# Patient Record
Sex: MALE | Race: WHITE | Employment: OTHER | URBAN - METROPOLITAN AREA
[De-identification: names, ages, dates, MRNs, and addresses within clinical notes are randomized per-mention and may not be internally consistent; named-entity substitution may affect disease eponyms.]

---

## 2019-10-31 PROBLEM — M65.342 ACQUIRED TRIGGER FINGER OF LEFT RING FINGER: Status: ACTIVE | Noted: 2019-10-31

## 2019-10-31 PROBLEM — M65.342 ACQUIRED TRIGGER FINGER OF LEFT RING FINGER: Status: RESOLVED | Noted: 2019-10-31 | Resolved: 2019-10-31

## 2021-07-06 PROBLEM — E78.5 HYPERLIPIDEMIA: Status: ACTIVE | Noted: 2021-02-17

## 2021-07-06 PROBLEM — E11.42 DIABETIC POLYNEUROPATHY ASSOCIATED WITH TYPE 2 DIABETES MELLITUS (HCC): Status: ACTIVE | Noted: 2021-02-17

## 2021-07-06 PROBLEM — K21.9 GERD (GASTROESOPHAGEAL REFLUX DISEASE): Status: ACTIVE | Noted: 2021-02-17

## 2021-07-06 PROBLEM — Z86.010 HISTORY OF COLONIC POLYPS: Status: ACTIVE | Noted: 2017-03-14

## 2021-07-06 PROBLEM — R73.02 GLUCOSE INTOLERANCE (IMPAIRED GLUCOSE TOLERANCE): Status: ACTIVE | Noted: 2021-02-17

## 2021-07-06 PROBLEM — G25.0 HEREDITARY ESSENTIAL TREMOR: Status: ACTIVE | Noted: 2019-04-24

## 2021-07-06 PROBLEM — E53.8 B12 DEFICIENCY: Status: ACTIVE | Noted: 2019-04-17

## 2021-07-06 PROBLEM — G25.1 DRUG-INDUCED TREMOR: Status: ACTIVE | Noted: 2021-02-17

## 2021-07-06 PROBLEM — G47.30 SLEEP APNEA: Status: ACTIVE | Noted: 2021-02-17

## 2021-07-06 PROBLEM — I10 HYPERTENSION: Status: ACTIVE | Noted: 2021-02-17

## 2021-07-07 ENCOUNTER — OFFICE VISIT (OUTPATIENT)
Dept: FAMILY MEDICINE CLINIC | Age: 76
End: 2021-07-07
Payer: MEDICARE

## 2021-07-07 VITALS
DIASTOLIC BLOOD PRESSURE: 76 MMHG | RESPIRATION RATE: 18 BRPM | HEIGHT: 68 IN | BODY MASS INDEX: 36 KG/M2 | SYSTOLIC BLOOD PRESSURE: 130 MMHG | WEIGHT: 237.5 LBS | HEART RATE: 62 BPM | TEMPERATURE: 97 F | OXYGEN SATURATION: 98 %

## 2021-07-07 DIAGNOSIS — K21.9 GASTROESOPHAGEAL REFLUX DISEASE WITHOUT ESOPHAGITIS: ICD-10-CM

## 2021-07-07 DIAGNOSIS — N40.1 BPH WITH OBSTRUCTION/LOWER URINARY TRACT SYMPTOMS: Primary | ICD-10-CM

## 2021-07-07 DIAGNOSIS — Z86.010 HISTORY OF COLONIC POLYPS: ICD-10-CM

## 2021-07-07 DIAGNOSIS — F39 EPISODIC MOOD DISORDER (HCC): ICD-10-CM

## 2021-07-07 DIAGNOSIS — G25.0 HEREDITARY ESSENTIAL TREMOR: ICD-10-CM

## 2021-07-07 DIAGNOSIS — G47.33 OBSTRUCTIVE SLEEP APNEA SYNDROME: ICD-10-CM

## 2021-07-07 DIAGNOSIS — E11.42 DIABETIC POLYNEUROPATHY ASSOCIATED WITH TYPE 2 DIABETES MELLITUS (HCC): ICD-10-CM

## 2021-07-07 DIAGNOSIS — I10 BENIGN ESSENTIAL HYPERTENSION: ICD-10-CM

## 2021-07-07 DIAGNOSIS — E78.2 MIXED HYPERLIPIDEMIA: ICD-10-CM

## 2021-07-07 DIAGNOSIS — N13.8 BPH WITH OBSTRUCTION/LOWER URINARY TRACT SYMPTOMS: Primary | ICD-10-CM

## 2021-07-07 PROBLEM — E53.8 B12 DEFICIENCY: Status: RESOLVED | Noted: 2019-04-17 | Resolved: 2021-07-07

## 2021-07-07 PROBLEM — G25.1 DRUG-INDUCED TREMOR: Status: RESOLVED | Noted: 2021-02-17 | Resolved: 2021-07-07

## 2021-07-07 PROBLEM — R73.02 GLUCOSE INTOLERANCE (IMPAIRED GLUCOSE TOLERANCE): Status: RESOLVED | Noted: 2021-02-17 | Resolved: 2021-07-07

## 2021-07-07 PROCEDURE — 3017F COLORECTAL CA SCREEN DOC REV: CPT | Performed by: FAMILY MEDICINE

## 2021-07-07 PROCEDURE — 3046F HEMOGLOBIN A1C LEVEL >9.0%: CPT | Performed by: FAMILY MEDICINE

## 2021-07-07 PROCEDURE — 99204 OFFICE O/P NEW MOD 45 MIN: CPT | Performed by: FAMILY MEDICINE

## 2021-07-07 PROCEDURE — 2022F DILAT RTA XM EVC RTNOPTHY: CPT | Performed by: FAMILY MEDICINE

## 2021-07-07 PROCEDURE — G8536 NO DOC ELDER MAL SCRN: HCPCS | Performed by: FAMILY MEDICINE

## 2021-07-07 PROCEDURE — G8417 CALC BMI ABV UP PARAM F/U: HCPCS | Performed by: FAMILY MEDICINE

## 2021-07-07 PROCEDURE — 1101F PT FALLS ASSESS-DOCD LE1/YR: CPT | Performed by: FAMILY MEDICINE

## 2021-07-07 PROCEDURE — G8752 SYS BP LESS 140: HCPCS | Performed by: FAMILY MEDICINE

## 2021-07-07 PROCEDURE — G8427 DOCREV CUR MEDS BY ELIG CLIN: HCPCS | Performed by: FAMILY MEDICINE

## 2021-07-07 PROCEDURE — G8432 DEP SCR NOT DOC, RNG: HCPCS | Performed by: FAMILY MEDICINE

## 2021-07-07 PROCEDURE — G8754 DIAS BP LESS 90: HCPCS | Performed by: FAMILY MEDICINE

## 2021-07-07 RX ORDER — DUTASTERIDE AND TAMSULOSIN HYDROCHLORIDE CAPSULES .5; .4 MG/1; MG/1
1 CAPSULE ORAL DAILY
Qty: 90 CAPSULE | Refills: 0 | Status: SHIPPED | OUTPATIENT
Start: 2021-07-07 | End: 2021-10-12

## 2021-07-07 NOTE — PROGRESS NOTES
1. Have you been to the ER, urgent care clinic since your last visit? Hospitalized since your last visit? No    2. Have you seen or consulted any other health care providers outside of the [address] [place] [address] since your last visit? Include any pap smears or colon screening.  Yes When: [facility] practice 5-26-21

## 2021-07-07 NOTE — PROGRESS NOTES
Mirtha Katz is a 76 y.o. male who presents with the following:  Chief Complaint   Patient presents with    Breathing Problem     Asthma    Depression    Hypertension    Cholesterol Problem    Benign Prostatic Hypertrophy     With symptoms    GERD       Patient states that he is had asthma on and off generally when he has a cold that will trigger it but currently is doing well without any wheezing or shortness of breath. The patient states that he has had depressive disorder which has been written up by someone else as an episodic mood disorder but the patient states he was definitely depressed and is doing much better on antidepressants at this time. The patient is having no suicidal thoughts. The patient has lipid problems and that he has hypertriglyceridemia as well as hyper cholesterolemia and these are being treated with pravastatin and fenofibrate without any muscle pain or weakness. Patient does have BPH with symptoms and is having to get up a couple of times a night which is disturbing his sleep and he would not mind a therapeutic trial of Flomax. Patient also has GERD which is treated with Nexium which he takes on a nightly basis and this tends to keep his heartburn under control. No Known Allergies    Current Outpatient Medications   Medication Sig    Dutasteride-Tamsulosin 0.5-0.4 mg CM24 Take 1 Capsule by mouth daily.  aspirin 81 mg chewable tablet Take 81 mg by mouth.  buPROPion (WELLBUTRIN) 75 mg tablet take 1 tablet by mouth twice a day    Omega-3 Fatty Acids 60- mg cpDR Take 1 Cap by mouth daily.  escitalopram oxalate (LEXAPRO) 10 mg tablet Take 10 mg by mouth daily. Indications: major depressive disorder    fenofibrate (LOFIBRA) 160 mg tablet Take 160 mg by mouth nightly.  pravastatin (PRAVACHOL) 40 mg tablet Take 40 mg by mouth nightly. Indications: hypercholesterolemia    esomeprazole (NEXIUM) 40 mg capsule Take 40 mg by mouth nightly.     acetaminophen (TYLENOL EXTRA STRENGTH) 500 mg tablet Take 1,000 mg by mouth every six (6) hours as needed for Pain.  albuterol (PROAIR HFA) 90 mcg/actuation inhaler Take 2 Puffs by inhalation.  PROPRANOLOL HCL (INDERAL PO) Take 120 mg by mouth daily. No current facility-administered medications for this visit. Past Medical History:   Diagnosis Date    B12 deficiency 4/17/2019    GERD (gastroesophageal reflux disease)     Glucose intolerance (impaired glucose tolerance) 2/17/2021    Hypertension     Ill-defined condition 2013    shingles    Ill-defined condition     high cholesterol    Ill-defined condition 12/2016    fall with concussion    Psychiatric disorder     depression    Sleep apnea     uses cpap       Past Surgical History:   Procedure Laterality Date    HX HEENT  12/12/2016    OPEN REDUCTION INTERNAL FIXATION LEFT FRONTAL SINUS FRACTURE    HX TONSILLECTOMY         Family History   Problem Relation Age of Onset    Cancer Mother     Heart Disease Mother     Cancer Father         brain    Diabetes Father     No Known Problems Sister     No Known Problems Sister        Social History     Socioeconomic History    Marital status:      Spouse name: Not on file    Number of children: Not on file    Years of education: Not on file    Highest education level: Not on file   Tobacco Use    Smoking status: Never Smoker    Smokeless tobacco: Never Used   Substance and Sexual Activity    Alcohol use: Yes     Comment: rare    Drug use: No     Social Determinants of Health     Financial Resource Strain:     Difficulty of Paying Living Expenses:    Food Insecurity:     Worried About Running Out of Food in the Last Year:     Ran Out of Food in the Last Year:    Transportation Needs:     Lack of Transportation (Medical):      Lack of Transportation (Non-Medical):    Physical Activity:     Days of Exercise per Week:     Minutes of Exercise per Session:    Stress:     Feeling of Stress : Social Connections:     Frequency of Communication with Friends and Family:     Frequency of Social Gatherings with Friends and Family:     Attends [employer] Services:     Active Member of Clubs or Organizations:     Attends Club or Organization Meetings:     Marital Status:        Review of Systems   Constitutional: Negative for chills, fever, malaise/fatigue and weight loss. HENT: Negative for congestion, hearing loss, sore throat and tinnitus. Eyes: Negative for blurred vision, pain and discharge. Respiratory: Negative for cough, shortness of breath and wheezing. Cardiovascular: Negative for chest pain, palpitations, orthopnea, claudication and leg swelling. Gastrointestinal: Negative for abdominal pain, constipation and heartburn. Genitourinary: Positive for frequency. Negative for dysuria and urgency. Nocturia   Musculoskeletal: Negative for falls, joint pain and myalgias. Skin: Negative for itching and rash. Neurological: Negative for dizziness, tingling, tremors and headaches. Endo/Heme/Allergies: Negative for environmental allergies and polydipsia. Psychiatric/Behavioral: Negative for depression and substance abuse. The patient is not nervous/anxious. Visit Vitals  /76   Pulse 62   Temp 97 °F (36.1 °C) (Temporal)   Resp 18   Ht 5' 8\" (1.727 m)   Wt 237 lb 8 oz (107.7 kg)   SpO2 98%   BMI 36.11 kg/m²     Physical Exam  Vitals reviewed. Constitutional:       General: He is not in acute distress. Appearance: Normal appearance. He is obese. He is not ill-appearing. HENT:      Head: Normocephalic and atraumatic. Right Ear: Tympanic membrane, ear canal and external ear normal.      Left Ear: Tympanic membrane, ear canal and external ear normal.      Nose: Nose normal. No congestion or rhinorrhea. Mouth/Throat:      Mouth: Mucous membranes are moist.      Pharynx: No oropharyngeal exudate or posterior oropharyngeal erythema.    Eyes: Extraocular Movements: Extraocular movements intact. Conjunctiva/sclera: Conjunctivae normal.      Pupils: Pupils are equal, round, and reactive to light. Comments: Pupil iris and anterior chamber normal.   Neck:      Trachea: No tracheal deviation. Cardiovascular:      Rate and Rhythm: Normal rate and regular rhythm. Pulses: Normal pulses. Heart sounds: Normal heart sounds. No murmur heard. No friction rub. No gallop. Pulmonary:      Effort: Pulmonary effort is normal. No respiratory distress. Breath sounds: Normal breath sounds. No wheezing, rhonchi or rales. Chest:      Chest wall: No tenderness. Abdominal:      General: Bowel sounds are normal. There is no distension. Palpations: Abdomen is soft. There is no mass. Tenderness: There is no abdominal tenderness. There is no guarding or rebound. Hernia: No hernia is present. Genitourinary:     Penis: Normal.       Testes: Normal.      Rectum: Normal.      Comments: Patient's prostate is enlarged but it is not with any masses and is smooth in general and its architecture and its texture is normal.  Musculoskeletal:         General: No tenderness. Normal range of motion. Cervical back: Normal range of motion and neck supple. Right lower leg: No edema. Left lower leg: No edema. Lymphadenopathy:      Cervical: No cervical adenopathy. Skin:     General: Skin is warm and dry. Findings: No erythema or rash. Neurological:      General: No focal deficit present. Mental Status: He is alert and oriented to person, place, and time. Cranial Nerves: No cranial nerve deficit. Motor: No abnormal muscle tone. Deep Tendon Reflexes: Reflexes are normal and symmetric. Reflexes normal.      Comments: Cranial nerves II through XII intact sensory and motor.   Deep tendon reflexes in the biceps triceps knee and ankle are normal and bilaterally symmetrical.   Psychiatric:         Mood and Affect: Mood normal.         Behavior: Behavior normal.         Thought Content: Thought content normal.         Judgment: Judgment normal.           ICD-10-CM ICD-9-CM    1. BPH with obstruction/lower urinary tract symptoms  N40.1 600.01 Dutasteride-Tamsulosin 0.5-0.4 mg CM24    N13.8 599.69    2. Benign essential hypertension  I10 401.1    3. Gastroesophageal reflux disease without esophagitis  K21.9 530.81    4. Diabetic polyneuropathy associated with type 2 diabetes mellitus (HCC)  E11.42 250.60      357.2    5. Hereditary essential tremor  G25.0 333.1    6. Obstructive sleep apnea syndrome  G47.33 327.23    7. Mixed hyperlipidemia  E78.2 272.2    8. History of colonic polyps  Z86.010 V12.72    9. Episodic mood disorder (HCC)  F39 296.90        Orders Placed This Encounter    Dutasteride-Tamsulosin 0.5-0.4 mg CM24     Sig: Take 1 Capsule by mouth daily.      Dispense:  90 Capsule     Refill:  0           Daiana Kelly MD

## 2021-08-31 ENCOUNTER — ANESTHESIA EVENT (OUTPATIENT)
Dept: SURGERY | Age: 76
End: 2021-08-31
Payer: MEDICARE

## 2021-08-31 NOTE — ANESTHESIA PREPROCEDURE EVALUATION
Relevant Problems   RESPIRATORY SYSTEM   (+) Sleep apnea      NEUROLOGY   (+) Episodic mood disorder (HCC)      CARDIOVASCULAR   (+) Hypertension      GASTROINTESTINAL   (+) GERD (gastroesophageal reflux disease)   (+) Hiatal hernia       Anesthetic History   No history of anesthetic complications            Review of Systems / Medical History  Patient summary reviewed, nursing notes reviewed and pertinent labs reviewed    Pulmonary        Sleep apnea: No treatment           Neuro/Psych         Psychiatric history (depression)     Cardiovascular    Hypertension          Hyperlipidemia    Exercise tolerance: >4 METS     GI/Hepatic/Renal     GERD: well controlled        Pertinent negatives: No hiatal hernia   Endo/Other    Diabetes: well controlled, type 2         Other Findings            Physical Exam    Airway  Mallampati: II  TM Distance: > 6 cm  Neck ROM: normal range of motion   Mouth opening: Normal     Cardiovascular      Rate: normal         Dental  No notable dental hx       Pulmonary  Breath sounds clear to auscultation               Abdominal  GI exam deferred       Other Findings            Anesthetic Plan    ASA: 2  Anesthesia type: MAC          Induction: Intravenous  Anesthetic plan and risks discussed with: Patient

## 2021-09-01 NOTE — PERIOP NOTES
[address] [place] [address]  SURGICAL PRE-ADMISSION INSTRUCTIONS    ARRIVAL  · You will be called the day before your surgery with your expected arrival time. · Sign in at the  of the hospital.  You will be directed to the Surgical Waiting Room. · Please arrive at your scheduled appointment time. You have been scheduled to arrive for your procedure one or two hours prior to the expected start time of your procedure. · Every effort will be made to minimize your wait but please be aware that unforeseen circumstances may affect our schedule. EATING  · DO NOT EAT OR DRINK ANYTHING AFTER MIDNIGHT ON THE EVENING BEFORE YOUR SURGERY OR ON THE DAY OF YOUR SURGERY except for your medications (as instructed) with a sip of water. · Do not use gum, mints or lozenges on the morning of your surgery. · Please do not smoke or chew tobacco before your surgery. MEDICATIONS   · NONE    STOP THESE MEDICATIONS AT THE TIMES LISTED BELOW  NONE     DRIVING/TRANSPORATION  · Have a responsible adult to drive you home from the hospital and to stay with you over night. Please have them plan to remain in the hospital during your surgery. Your surgery will not be done if you do not have a responsible adult to take you home and to stay with you. · If you have arranged for public transport, you must have a responsible adult to ride with you (who is not the ). · You may not drive for 24 hours after anesthesia. PREPARATION  · If you have a Living WiIl/Advance Directive, please bring a copy with you to scan into your chart. · Please DO NOT wear makeup or nail polish  · Please leave valuables at home,  DO NOT wear jewelry. · Wear loose, comfortable clothing that is large enough to cover a bulky dressing. SPECIAL INSTRUCTIONS:  · Shower with the Preoperative Skin Preparation CHLORHEXIDINE as instructed.     Reviewed above preoperative instructions and answered questions by phone interview    Patient:  Johanna Lucrecia   Date:     September 1, 2021  Time:   1:05 PM    RN:  Benjamin Brooke RN    Date:     September 1, 2021  Time:   1:05 PM

## 2021-09-06 PROBLEM — M65.341 ACQUIRED TRIGGER FINGER OF RIGHT RING FINGER: Status: ACTIVE | Noted: 2021-09-06

## 2021-09-07 ENCOUNTER — ANESTHESIA (OUTPATIENT)
Dept: SURGERY | Age: 76
End: 2021-09-07
Payer: MEDICARE

## 2021-09-07 ENCOUNTER — HOSPITAL ENCOUNTER (OUTPATIENT)
Age: 76
Setting detail: OUTPATIENT SURGERY
Discharge: HOME | End: 2021-09-07
Attending: ORTHOPAEDIC SURGERY | Admitting: ORTHOPAEDIC SURGERY
Payer: MEDICARE

## 2021-09-07 VITALS
TEMPERATURE: 96.5 F | HEART RATE: 60 BPM | DIASTOLIC BLOOD PRESSURE: 61 MMHG | RESPIRATION RATE: 16 BRPM | SYSTOLIC BLOOD PRESSURE: 146 MMHG | BODY MASS INDEX: 35.92 KG/M2 | HEIGHT: 68 IN | OXYGEN SATURATION: 95 % | WEIGHT: 237 LBS

## 2021-09-07 DIAGNOSIS — M65.341 ACQUIRED TRIGGER FINGER OF RIGHT RING FINGER: Primary | ICD-10-CM

## 2021-09-07 PROCEDURE — 74011000250 HC RX REV CODE- 250: Performed by: ORTHOPAEDIC SURGERY

## 2021-09-07 PROCEDURE — 76060000032 HC ANESTHESIA 0.5 TO 1 HR: Performed by: ORTHOPAEDIC SURGERY

## 2021-09-07 PROCEDURE — 74011250636 HC RX REV CODE- 250/636: Performed by: ANESTHESIOLOGY

## 2021-09-07 PROCEDURE — 74011000250 HC RX REV CODE- 250: Performed by: ANESTHESIOLOGY

## 2021-09-07 PROCEDURE — 77030000032 HC CUF TRNQT ZIMM -B: Performed by: ORTHOPAEDIC SURGERY

## 2021-09-07 PROCEDURE — 76010000138 HC OR TIME 0.5 TO 1 HR: Performed by: ORTHOPAEDIC SURGERY

## 2021-09-07 PROCEDURE — 74011250636 HC RX REV CODE- 250/636: Performed by: ORTHOPAEDIC SURGERY

## 2021-09-07 PROCEDURE — 77030002916 HC SUT ETHLN J&J -A: Performed by: ORTHOPAEDIC SURGERY

## 2021-09-07 PROCEDURE — 76210000063 HC OR PH I REC FIRST 0.5 HR: Performed by: ORTHOPAEDIC SURGERY

## 2021-09-07 PROCEDURE — 2709999900 HC NON-CHARGEABLE SUPPLY: Performed by: ORTHOPAEDIC SURGERY

## 2021-09-07 RX ORDER — MIDAZOLAM HYDROCHLORIDE 1 MG/ML
INJECTION, SOLUTION INTRAMUSCULAR; INTRAVENOUS AS NEEDED
Status: DISCONTINUED | OUTPATIENT
Start: 2021-09-07 | End: 2021-09-07 | Stop reason: HOSPADM

## 2021-09-07 RX ORDER — BUPIVACAINE HYDROCHLORIDE 5 MG/ML
10 INJECTION, SOLUTION EPIDURAL; INTRACAUDAL ONCE
Status: COMPLETED | OUTPATIENT
Start: 2021-09-07 | End: 2021-09-07

## 2021-09-07 RX ORDER — FENTANYL CITRATE 50 UG/ML
INJECTION, SOLUTION INTRAMUSCULAR; INTRAVENOUS AS NEEDED
Status: DISCONTINUED | OUTPATIENT
Start: 2021-09-07 | End: 2021-09-07 | Stop reason: HOSPADM

## 2021-09-07 RX ORDER — HYDROCODONE BITARTRATE AND ACETAMINOPHEN 5; 325 MG/1; MG/1
1 TABLET ORAL
Qty: 6 TABLET | Refills: 0 | Status: SHIPPED | OUTPATIENT
Start: 2021-09-07 | End: 2021-09-10

## 2021-09-07 RX ORDER — SODIUM CHLORIDE, SODIUM LACTATE, POTASSIUM CHLORIDE, CALCIUM CHLORIDE 600; 310; 30; 20 MG/100ML; MG/100ML; MG/100ML; MG/100ML
1000 INJECTION, SOLUTION INTRAVENOUS CONTINUOUS
Status: DISCONTINUED | OUTPATIENT
Start: 2021-09-07 | End: 2021-09-07 | Stop reason: HOSPADM

## 2021-09-07 RX ORDER — LIDOCAINE HYDROCHLORIDE 20 MG/ML
INJECTION, SOLUTION EPIDURAL; INFILTRATION; INTRACAUDAL; PERINEURAL AS NEEDED
Status: DISCONTINUED | OUTPATIENT
Start: 2021-09-07 | End: 2021-09-07 | Stop reason: HOSPADM

## 2021-09-07 RX ORDER — GLYCOPYRROLATE 0.2 MG/ML
INJECTION INTRAMUSCULAR; INTRAVENOUS AS NEEDED
Status: DISCONTINUED | OUTPATIENT
Start: 2021-09-07 | End: 2021-09-07 | Stop reason: HOSPADM

## 2021-09-07 RX ORDER — PROPOFOL 10 MG/ML
INJECTION, EMULSION INTRAVENOUS AS NEEDED
Status: DISCONTINUED | OUTPATIENT
Start: 2021-09-07 | End: 2021-09-07 | Stop reason: HOSPADM

## 2021-09-07 RX ADMIN — GLYCOPYRROLATE 0.2 MG: 0.2 INJECTION, SOLUTION INTRAMUSCULAR; INTRAVENOUS at 13:23

## 2021-09-07 RX ADMIN — FENTANYL CITRATE 100 MCG: 50 INJECTION, SOLUTION INTRAMUSCULAR; INTRAVENOUS at 13:23

## 2021-09-07 RX ADMIN — SODIUM CHLORIDE, POTASSIUM CHLORIDE, SODIUM LACTATE AND CALCIUM CHLORIDE 1000 ML: 600; 310; 30; 20 INJECTION, SOLUTION INTRAVENOUS at 12:15

## 2021-09-07 RX ADMIN — MIDAZOLAM 2 MG: 1 INJECTION INTRAMUSCULAR; INTRAVENOUS at 13:14

## 2021-09-07 RX ADMIN — PROPOFOL 30 MG: 10 INJECTION, EMULSION INTRAVENOUS at 13:37

## 2021-09-07 RX ADMIN — LIDOCAINE HYDROCHLORIDE 100 MG: 20 INJECTION, SOLUTION EPIDURAL; INFILTRATION; INTRACAUDAL; PERINEURAL at 13:24

## 2021-09-07 RX ADMIN — BUPIVACAINE HYDROCHLORIDE 50 MG: 5 INJECTION, SOLUTION EPIDURAL; INTRACAUDAL at 13:41

## 2021-09-07 RX ADMIN — PROPOFOL 30 MG: 10 INJECTION, EMULSION INTRAVENOUS at 13:36

## 2021-09-07 NOTE — OP NOTES
[address] [place] [address]   [address] [facility], [address], [address] [facility]   OPERATIVE REPORT            Name: Nazario Lubin  MRN:  [id number]  : 1945  Age:  68 y.o. Surgery Date: 2021      PREOPERATIVE DIAGNOSIS: Symptomatic right ring trigger finger.     POSTOPERATIVE DIAGNOSIS: Symptomatic right ring trigger finger.     PROCEDURES PERFORMED: right ring A1 pulley release.       SURGEON: Lazaro Spears MD.      ANESTHESIA: Local MAC.     ESTIMATED BLOOD LOSS: 0 tourniquet control.     COMPLICATIONS: None.     OPERATIVE FINDINGS: Stenosis of A1 pulley at the flexor tendon.     SPECIMENS REMOVED: none     DESCRIPTION OF PROCEDURE: The patient was evaluated in the   outpatient the patient surgery area. Surgical site was marked and verified with   the patient. The patient was taken to the operating room, placed supine on the   operating table. The arm placed on the arm board. After adequate sedation, the   area was anesthetized with 5 mL of 0.5% Marcaine. The extremity prepped   and draped in usual sterile fashion. Time-out was called, verifying the patient   site and surgical procedure. The extremity was exsanguinated and tourniquet   inflated to 250 mmHg. A transverse incision was made over the involved   finger. Dissection carried sharply through skin, bluntly through subcutaneous   tissue. The retractors were inserted. The neurovascular bundles were retracted   to either side. This gave exposure to the tendon sheath in the wound. After   appropriate retractors were inserted, the proximal portion of the tendon sheath   was incised and split. Additional split crried distally with scissors under direct visualization, completing the   split of the A1 pulley. At this point, the operative site was irrigated with   antibiotic saline solution. The finger was taken through a range of motion to   verify that there was no further triggering through a full range of motion.  The incision closed with 4-0 nylon interrupted sutures. A sterile dressing was   applied. Tourniquet was released for a tourniquet time of 8 minutes.  The   patient returned to the recovery room in satisfactory condition.           Scott Atkinson MD

## 2021-09-07 NOTE — DISCHARGE INSTRUCTIONS
Patricia Izaguirre MD      POST-OPERATIVE INSTRUCTIONS  Carpal Tunnel Release    Patient Name  Zbigniew Haney  Date of procedure  9/7/2021    Procedure  Procedure(s):  RIGHT HAND 4TH DIGIT TRIGGER FINGER RELEASE (MAC WITH LOCAL)  Surgeon  Surgeon(s) and Role:     * Dallas Boston MD - Primary  Date of discharge: No discharge date for patient encounter. PCP: Keith Ludwig MD      1. First meal at home should be clear liquids. Progress to regular diet as tolerated. 2. Apply an ice bag or use cold therapy device for 20-30 minutes 4 times a day for the first 48-72 hours to reduce swelling and pain and then use as desired. 3. A prescription for pain medication will be provided to you on the day of surgery. Do not take any aspirin for one week after surgery. Please inform us of any allergies. 4. You may remove the bulky dressing 48 hours after surgery and leave the clear dressing in place. You may shower with clear dressing in place. You may remove the clear dressing 5 days after your surgery, if necessary, and cover with a bandaid. Avoid any lifting, gripping, or use with that hand until follow-up appointment. 5. A post-op appointment has been scheduled for you. Please call the office if you need to re-schedule your appointment for another day or time ([phone]). 6. If you develop a fever greater than 101, unexpected redness or swelling, please call the office immediately. Some bleeding and or drainage can be expected the first few days after surgery. Thank you for following the above instructions. If you have any questions, please call my office and the  will put you in touch with one of my assistants ([phone]).     ______________________________________________________________________    Anesthesia Discharge Instructions    After general anesthesia or intervenous sedation, for 24 hours or while taking prescription Narcotics:  · Limit your activities  · Do not drive or operate hazardous machinery  · If you have not urinated within 8 hours after discharge, please contact your surgeon on call. · Do not make important personal or business decisions  · Do not drink alcoholic beverages    Report the following to your surgeon:  · Excessive pain, swelling, redness or odor of or around the surgical area  · Temperature over 100.5 degrees  · Nausea and vomiting lasting longer than 4 hours or if unable to take medication  · Any signs of decreased circulation or nerve impairment to extremity:  Change in color, persistent numbness, tingling, coldness or increased pain.   · Any questions

## 2021-09-07 NOTE — ANESTHESIA POSTPROCEDURE EVALUATION
Procedure(s):  RIGHT HAND 4TH DIGIT TRIGGER FINGER RELEASE (MAC WITH LOCAL). No value filed. Anesthesia Post Evaluation      Multimodal analgesia: multimodal analgesia used between 6 hours prior to anesthesia start to PACU discharge  Patient location during evaluation: PACU  Patient participation: complete - patient participated  Level of consciousness: awake  Pain score: 0  Airway patency: patent  Anesthetic complications: no  Cardiovascular status: acceptable  Respiratory status: acceptable  Hydration status: acceptable  Post anesthesia nausea and vomiting:  none  Final Post Anesthesia Temperature Assessment:  Normothermia (36.0-37.5 degrees C)      INITIAL Post-op Vital signs:   Vitals Value Taken Time   /61 09/07/21 1415   Temp     Pulse 59 09/07/21 1419   Resp     SpO2 95 % 09/07/21 1419   Vitals shown include unvalidated device data.

## 2021-09-07 NOTE — H&P
History and Physical    Patient: Johanna Singer MRN: [id number]  SSN: [id number]    YOB: 1945  Age: 68 y.o. Sex: male      Subjective:      Johanna Singer is a 68 y.o. male who  Presented to my office with a locked right ring trigger finger. He has failed nonsurgical management of this. He is here for right trigger finger release of the ring finger. See office note for more details. Past Medical History:   Diagnosis Date    B12 deficiency 4/17/2019    GERD (gastroesophageal reflux disease)     Glucose intolerance (impaired glucose tolerance) 2/17/2021    Hypertension     Ill-defined condition 2013    shingles    Ill-defined condition     high cholesterol    Ill-defined condition 12/2016    fall with concussion    Psychiatric disorder     depression    Sleep apnea     uses cpap     Past Surgical History:   Procedure Laterality Date    HX CATARACT REMOVAL Bilateral     HX CYST REMOVAL  2018    back of neck    HX HEENT  12/12/2016    OPEN REDUCTION INTERNAL FIXATION LEFT FRONTAL SINUS FRACTURE    HX HERNIA REPAIR  2018    HX TONSILLECTOMY        Family History   Problem Relation Age of Onset    Cancer Mother     Heart Disease Mother     Cancer Father         brain    Diabetes Father     No Known Problems Sister     No Known Problems Sister      Social History     Tobacco Use    Smoking status: Never Smoker    Smokeless tobacco: Never Used   Substance Use Topics    Alcohol use: Yes     Comment: rare      Prior to Admission medications    Medication Sig Start Date End Date Taking? Authorizing Provider   Dutasteride-Tamsulosin 0.5-0.4 mg CM24 Take 1 Capsule by mouth daily. 7/7/21  Yes Alfredo Brice MD   aspirin 81 mg chewable tablet Take 81 mg by mouth. Yes Provider, Historical   buPROPion (WELLBUTRIN) 75 mg tablet take 1 tablet by mouth twice a day 8/19/19  Yes Provider, Historical   Omega-3 Fatty Acids 60- mg cpDR Take 1 Cap by mouth daily.    Yes Provider, Historical   escitalopram oxalate (LEXAPRO) 10 mg tablet Take 10 mg by mouth daily. Indications: major depressive disorder   Yes Provider, Historical   fenofibrate (LOFIBRA) 160 mg tablet Take 160 mg by mouth nightly. Yes Provider, Historical   pravastatin (PRAVACHOL) 40 mg tablet Take 40 mg by mouth nightly. Indications: hypercholesterolemia   Yes Provider, Historical   esomeprazole (NEXIUM) 40 mg capsule Take 40 mg by mouth nightly. Yes Provider, Historical   acetaminophen (TYLENOL EXTRA STRENGTH) 500 mg tablet Take 1,000 mg by mouth every six (6) hours as needed for Pain. Yes Provider, Historical        No Known Allergies    Review of Systems:  A comprehensive review of systems was negative. Objective: There were no vitals filed for this visit. Physical Exam:  GENERAL: alert, cooperative, no distress, appears stated age  LUNG: clear to auscultation bilaterally  HEART: regular rate and rhythm, S1, S2 normal, no murmur, click, rub or gallop  ABDOMEN: soft, non-tender. Bowel sounds normal. No masses,  no organomegaly  EXTREMITIES:  extremities normal, atraumatic, no cyanosis or edema, locked right ring trigger finger. SKIN: Normal.  NEUROLOGIC: negative    Assessment:     Hospital Problems  Date Reviewed: 7/7/2021        Codes Class Noted POA    * (Principal) Acquired trigger finger of right ring finger ICD-10-CM: M65.341  ICD-9-CM: 727.03  9/6/2021 Yes              Plan:      right ring trigger finger release.     Signed By: Nadir Landis MD     September 6, 2021

## 2021-09-22 ENCOUNTER — VIRTUAL VISIT (OUTPATIENT)
Dept: FAMILY MEDICINE CLINIC | Age: 76
End: 2021-09-22
Payer: MEDICARE

## 2021-09-22 DIAGNOSIS — E66.01 SEVERE OBESITY (BMI 35.0-35.9 WITH COMORBIDITY) (HCC): ICD-10-CM

## 2021-09-22 DIAGNOSIS — L23.7 POISON IVY: ICD-10-CM

## 2021-09-22 DIAGNOSIS — R05.9 COUGH: Primary | ICD-10-CM

## 2021-09-22 PROCEDURE — 99213 OFFICE O/P EST LOW 20 MIN: CPT | Performed by: NURSE PRACTITIONER

## 2021-09-22 RX ORDER — PREDNISONE 10 MG/1
TABLET ORAL
Qty: 21 TABLET | Refills: 0 | Status: SHIPPED | OUTPATIENT
Start: 2021-09-22

## 2021-09-22 RX ORDER — TRIAMCINOLONE ACETONIDE 1 MG/G
OINTMENT TOPICAL 2 TIMES DAILY
Qty: 30 G | Refills: 0 | Status: SHIPPED | OUTPATIENT
Start: 2021-09-22

## 2021-09-22 NOTE — PROGRESS NOTES
1. Have you been to the ER, urgent care clinic since your last visit? Hospitalized since your last visit? No    2. Have you seen or consulted any other health care providers outside of the [address] [place] [address] since your last visit? Include any pap smears or colon screening.  No     Chief Complaint   Patient presents with    Cough    Poison Ivy/Poison Oak/Poison Sumac Exposure

## 2021-09-22 NOTE — PROGRESS NOTES
Consent:  He and/or his healthcare decision maker is aware that this patient-initiated Telehealth encounter is a billable service, with coverage as determined by his insurance carrier. He is aware that he may receive a bill and has provided verbal consent to proceed: Yes    I was in the office while conducting this encounter. Hallie Grace is a 68 y.o. male who was seen by synchronous (real-time) audio-video technology on 9/22/2021. Pt was seen at home. No other participants in this encounter. Subjective:   Cough and Poison Ivy/Poison [place]/Poison [place] Exposure  Cough  Reports cough X 2 months. He is fully vaccinated for the flu and covid. Denies covid contacts. States the cough is 'dry and hacky, but also sounds wet.' Denies fevers, SOB. Poison ivy  He reports being in the woods for the last few days and was exposed to poison ivy. The poison ivy is on his torso and his legs. Reports itchy red blistering papules. Denies SOB. He has had prednisone in the past for poison ivy exposure. PMH, SH, Medications/Allergies: reviewed, on chart. Current Outpatient Medications   Medication Sig    predniSONE (STERAPRED DS) 10 mg dose pack See administration instruction per 10mg dose pack    triamcinolone acetonide (KENALOG) 0.1 % ointment Apply  to affected area two (2) times a day. use thin layer    Dutasteride-Tamsulosin 0.5-0.4 mg CM24 Take 1 Capsule by mouth daily.  aspirin 81 mg chewable tablet Take 81 mg by mouth.  buPROPion (WELLBUTRIN) 75 mg tablet take 1 tablet by mouth twice a day    Omega-3 Fatty Acids 60- mg cpDR Take 1 Cap by mouth daily.  escitalopram oxalate (LEXAPRO) 10 mg tablet Take 10 mg by mouth daily. Indications: major depressive disorder    fenofibrate (LOFIBRA) 160 mg tablet Take 160 mg by mouth nightly.  pravastatin (PRAVACHOL) 40 mg tablet Take 40 mg by mouth nightly.  Indications: hypercholesterolemia    esomeprazole (NEXIUM) 40 mg capsule Take 40 mg by mouth nightly.  acetaminophen (TYLENOL EXTRA STRENGTH) 500 mg tablet Take 1,000 mg by mouth every six (6) hours as needed for Pain. (Patient not taking: Reported on 9/7/2021)     No current facility-administered medications for this visit. No Known Allergies    ROS:  Constitutional: No fever, No chills or abnormal weight loss  Respiratory: +cough, No SOB   CV: No chest pain or Palpitations    VS review: Wt Readings from Last 3 Encounters:   09/07/21 237 lb (107.5 kg)   07/07/21 237 lb 8 oz (107.7 kg)   10/31/19 244 lb (110.7 kg)     BP Readings from Last 3 Encounters:   09/07/21 (!) 146/61   07/07/21 130/76   10/31/19 139/66       Objective:     General: alert, cooperative, no distress   Mental  status: mental status: alert, oriented to person, place, and time, normal mood, behavior, speech, dress, motor activity, and thought processes   Resp: resp: normal effort, no respiratory distress and coughing - dry   Neuro: neuro: no gross deficits   Skin: skin: LESIONS NOTED:multiple scattered red papules with blistering to torso and lower extremities. Assessment & Plan:   Cough  Chest x-ray ordered  Will call with results  Start taking prednisone as ordered  F/U: As needed  Poison ivy  Start taking prednisone as ordered  Apply triamcinolone cream to affected areas  F/U: as needed    Time-based coding, delete if not needed: I spent at least 25 minutes with this established patient, and >50% of the time was spent counseling and/or coordinating care regarding cough and poison sherrell  Claudia Laboy NP      Due to this being a TeleHealth evaluation, many elements of the physical examination are unable to be assessed. We discussed the expected course, resolution and complications of the diagnosis(es) in detail. Medication risks, benefits, costs, interactions, and alternatives were discussed as indicated. I advised him to contact the office if his condition worsens, changes or fails to improve as anticipated.  He expressed understanding with the diagnosis(es) and plan. Pursuant to the emergency declaration under the [facility], [facility] waiver authority and the [employer] and [employer] Act, this Virtual  Visit was conducted, with patient's consent, to reduce the patient's risk of exposure to COVID-19 and provide continuity of care for an established patient. Services were provided through a video synchronous discussion virtually to substitute for in-person clinic visit.     CPT Codes 34900-00004 for Established Patients may apply to this Telehealth Visit

## 2021-09-23 ENCOUNTER — HOSPITAL ENCOUNTER (OUTPATIENT)
Dept: GENERAL RADIOLOGY | Age: 76
Discharge: HOME | End: 2021-09-23
Payer: MEDICARE

## 2021-09-23 ENCOUNTER — TRANSCRIBE ORDER (OUTPATIENT)
Dept: REGISTRATION | Age: 76
End: 2021-09-23

## 2021-09-23 DIAGNOSIS — R05.9 COUGH: ICD-10-CM

## 2021-09-23 DIAGNOSIS — R05.9 COUGH: Primary | ICD-10-CM

## 2021-09-23 PROCEDURE — 71046 X-RAY EXAM CHEST 2 VIEWS: CPT

## 2021-09-23 NOTE — PROGRESS NOTES
Chest x-ray does not show any signs of infection. F/U after taking the steroid if symptoms do not improve. Thanks!

## 2021-10-11 DIAGNOSIS — N40.1 BPH WITH OBSTRUCTION/LOWER URINARY TRACT SYMPTOMS: ICD-10-CM

## 2021-10-11 DIAGNOSIS — N13.8 BPH WITH OBSTRUCTION/LOWER URINARY TRACT SYMPTOMS: ICD-10-CM

## 2021-10-12 RX ORDER — DUTASTERIDE AND TAMSULOSIN HYDROCHLORIDE CAPSULES .5; .4 MG/1; MG/1
1 CAPSULE ORAL DAILY
Qty: 90 CAPSULE | Refills: 0 | Status: SHIPPED | OUTPATIENT
Start: 2021-10-12

## 2022-03-18 PROBLEM — I10 HYPERTENSION: Status: ACTIVE | Noted: 2021-02-17

## 2022-03-19 PROBLEM — Z86.010 HISTORY OF COLONIC POLYPS: Status: ACTIVE | Noted: 2017-03-14

## 2022-03-19 PROBLEM — E78.5 HYPERLIPIDEMIA: Status: ACTIVE | Noted: 2021-02-17

## 2022-03-19 PROBLEM — K21.9 GERD (GASTROESOPHAGEAL REFLUX DISEASE): Status: ACTIVE | Noted: 2021-02-17

## 2022-03-19 PROBLEM — Z86.0100 HISTORY OF COLONIC POLYPS: Status: ACTIVE | Noted: 2017-03-14

## 2022-03-19 PROBLEM — G47.30 SLEEP APNEA: Status: ACTIVE | Noted: 2021-02-17

## 2022-03-19 PROBLEM — G25.0 HEREDITARY ESSENTIAL TREMOR: Status: ACTIVE | Noted: 2019-04-24

## 2022-03-19 PROBLEM — E11.42 DIABETIC POLYNEUROPATHY ASSOCIATED WITH TYPE 2 DIABETES MELLITUS (HCC): Status: ACTIVE | Noted: 2021-02-17

## 2024-04-18 ENCOUNTER — HOSPITAL ENCOUNTER (OUTPATIENT)
Facility: HOSPITAL | Age: 79
Discharge: HOME | End: 2024-04-18
Payer: MEDICARE

## 2024-04-18 ENCOUNTER — TRANSCRIBE ORDERS (OUTPATIENT)
Facility: HOSPITAL | Age: 79
End: 2024-04-18

## 2024-04-18 DIAGNOSIS — M25.562 LEFT KNEE PAIN, UNSPECIFIED CHRONICITY: Primary | ICD-10-CM

## 2024-04-18 DIAGNOSIS — M25.562 LEFT KNEE PAIN, UNSPECIFIED CHRONICITY: ICD-10-CM

## 2024-04-18 PROCEDURE — 73564 X-RAY EXAM KNEE 4 OR MORE: CPT

## 2025-04-17 DIAGNOSIS — S42.021D CLOSED DISPLACED FRACTURE OF SHAFT OF RIGHT CLAVICLE WITH ROUTINE HEALING, SUBSEQUENT ENCOUNTER: Primary | ICD-10-CM

## 2025-04-17 DIAGNOSIS — S42.001A CLOSED DISPLACED FRACTURE OF RIGHT CLAVICLE, INITIAL ENCOUNTER: Primary | ICD-10-CM

## 2025-04-17 RX ORDER — TRAMADOL HYDROCHLORIDE 50 MG/1
50 TABLET ORAL EVERY 6 HOURS PRN
Qty: 20 TABLET | Refills: 0 | Status: SHIPPED | OUTPATIENT
Start: 2025-04-17 | End: 2025-04-22

## 2025-04-17 RX ORDER — TRAMADOL HYDROCHLORIDE 50 MG/1
50 TABLET ORAL EVERY 6 HOURS PRN
Qty: 20 TABLET | Refills: 0 | OUTPATIENT
Start: 2025-04-17 | End: 2025-04-22

## 2025-04-17 NOTE — PROGRESS NOTES
Patient needs additional pain medication for closed midshaft clavicle fracture which occurred 2 days ago.  Discussion on narcotic pain medications was carried out with the patient and his wife.  He is to try to use combination of ibuprofen and Tylenol if possible.

## 2025-05-08 ENCOUNTER — HOSPITAL ENCOUNTER (OUTPATIENT)
Facility: HOSPITAL | Age: 80
Discharge: HOME | End: 2025-05-11
Payer: MEDICARE

## 2025-05-08 DIAGNOSIS — S42.024A CLOSED NONDISPLACED FRACTURE OF SHAFT OF RIGHT CLAVICLE, INITIAL ENCOUNTER: ICD-10-CM

## 2025-05-08 PROCEDURE — 73000 X-RAY EXAM OF COLLAR BONE: CPT

## 2025-05-29 ENCOUNTER — TRANSCRIBE ORDERS (OUTPATIENT)
Facility: HOSPITAL | Age: 80
End: 2025-05-29

## 2025-05-29 ENCOUNTER — HOSPITAL ENCOUNTER (OUTPATIENT)
Facility: HOSPITAL | Age: 80
Discharge: HOME | End: 2025-05-29
Payer: MEDICARE

## 2025-05-29 DIAGNOSIS — S42.024B: ICD-10-CM

## 2025-05-29 DIAGNOSIS — S42.024B: Primary | ICD-10-CM

## 2025-05-29 PROCEDURE — 73000 X-RAY EXAM OF COLLAR BONE: CPT

## (undated) DEVICE — DRAPE,EXTREMITY,89X128,STERILE: Brand: MEDLINE

## (undated) DEVICE — GOWN,REINFORCED,POLY,AURORA,XLARGE,STRL: Brand: MEDLINE

## (undated) DEVICE — DRAPE SHT 3 QTR PROXIMA 53X77 --

## (undated) DEVICE — ZIMMER® STERILE DISPOSABLE TOURNIQUET CUFF WITH PROTECTIVE SLEEVE AND PLC, DUAL PORT, SINGLE BLADDER, 24 IN. (61 CM)

## (undated) DEVICE — 1230 DOUBLE MAGNET NEEDLE COUNTER,BLACK: Brand: DEVON

## (undated) DEVICE — STERILE POLYISOPRENE POWDER-FREE SURGICAL GLOVES: Brand: PROTEXIS

## (undated) DEVICE — SUT ETHLN 4-0 18IN FS2 BLK --

## (undated) DEVICE — COVER,TABLE,44X76,STERILE: Brand: MEDLINE

## (undated) DEVICE — GAUZE,SPONGE,4"X4",16PLY,STRL,LF,10/TRAY: Brand: MEDLINE

## (undated) DEVICE — NEEDLE HYPO 18GA L1.5IN PNK POLYPR HUB S STL THN WALL FILL

## (undated) DEVICE — APPLICATOR SCRB 26ML TEAL STRL -- CHLORAPREP 26ML

## (undated) DEVICE — X-RAY DETECTABLE SPONGES,16 PLY: Brand: VISTEC

## (undated) DEVICE — SOLUTION IRRIG 1000ML 0.9% SOD CHL USP POUR PLAS BTL

## (undated) DEVICE — INTENDED FOR TISSUE SEPARATION, AND OTHER PROCEDURES THAT REQUIRE A SHARP SURGICAL BLADE TO PUNCTURE OR CUT.: Brand: BARD-PARKER SAFETY BLADES SIZE 15, STERILE

## (undated) DEVICE — Z DISCONTINUED USE 2272124 DRAPE SURG XL N INVASIVE 2 LAYR DISP

## (undated) DEVICE — BANDAGE,ELASTIC,ESMARK,STERILE,4"X9',LF: Brand: MEDLINE

## (undated) DEVICE — PADDING CAST W4INXL4YD HIGHLY ABSRB THAN COT EZ APPL

## (undated) DEVICE — SYR 10ML LUER LOK 1/5ML GRAD --

## (undated) DEVICE — SYRINGE BLB FEED IV POLE BG 60ML

## (undated) DEVICE — SKIN MARKER,REGULAR TIP WITH RULER: Brand: DEVON

## (undated) DEVICE — NEEDLE HYPO 21GA L1.5IN GRN POLYPR HUB S STL THN WALL IV

## (undated) DEVICE — LABEL STERILIZATION W/PEN -- 50/CA

## (undated) DEVICE — 3M™ TEGADERM™ HP TRANSPARENT FILM DRESSING FRAME STYLE, 9534HP, 2-3/8 X 2-3/4 IN (6 CM X 7 CM), 100/CT 4CT/CASE: Brand: 3M™ TEGADERM™

## (undated) DEVICE — INFECTION CONTROL KIT SYS